# Patient Record
Sex: FEMALE | Race: WHITE | NOT HISPANIC OR LATINO
[De-identification: names, ages, dates, MRNs, and addresses within clinical notes are randomized per-mention and may not be internally consistent; named-entity substitution may affect disease eponyms.]

---

## 2017-07-25 PROBLEM — Z00.00 ENCOUNTER FOR PREVENTIVE HEALTH EXAMINATION: Status: ACTIVE | Noted: 2017-07-25

## 2017-07-26 ENCOUNTER — APPOINTMENT (OUTPATIENT)
Dept: GASTROENTEROLOGY | Facility: CLINIC | Age: 57
End: 2017-07-26

## 2017-07-26 VITALS
SYSTOLIC BLOOD PRESSURE: 98 MMHG | TEMPERATURE: 98.3 F | HEIGHT: 66 IN | HEART RATE: 110 BPM | DIASTOLIC BLOOD PRESSURE: 68 MMHG | WEIGHT: 118 LBS | OXYGEN SATURATION: 95 % | BODY MASS INDEX: 18.96 KG/M2

## 2017-07-26 DIAGNOSIS — F17.200 NICOTINE DEPENDENCE, UNSPECIFIED, UNCOMPLICATED: ICD-10-CM

## 2017-07-26 DIAGNOSIS — Z87.19 PERSONAL HISTORY OF OTHER DISEASES OF THE DIGESTIVE SYSTEM: ICD-10-CM

## 2017-07-26 RX ORDER — OMEPRAZOLE 40 MG/1
40 CAPSULE, DELAYED RELEASE ORAL
Qty: 90 | Refills: 0 | Status: ACTIVE | COMMUNITY
Start: 2017-07-26 | End: 1900-01-01

## 2017-07-26 RX ORDER — OXYCODONE AND ACETAMINOPHEN TABLETS 10; 300 MG/1; MG/1
TABLET ORAL
Refills: 0 | Status: ACTIVE | COMMUNITY

## 2017-07-26 RX ORDER — QUETIAPINE 100 MG/1
100 TABLET, FILM COATED ORAL
Refills: 0 | Status: ACTIVE | COMMUNITY

## 2017-07-28 ENCOUNTER — APPOINTMENT (OUTPATIENT)
Dept: GASTROENTEROLOGY | Facility: AMBULATORY MEDICAL SERVICES | Age: 57
End: 2017-07-28
Payer: COMMERCIAL

## 2017-07-28 PROCEDURE — 45380 COLONOSCOPY AND BIOPSY: CPT

## 2017-07-28 PROCEDURE — 45378 DIAGNOSTIC COLONOSCOPY: CPT

## 2017-07-28 PROCEDURE — 43239 EGD BIOPSY SINGLE/MULTIPLE: CPT

## 2017-07-28 PROCEDURE — 43235 EGD DIAGNOSTIC BRUSH WASH: CPT

## 2017-08-03 ENCOUNTER — APPOINTMENT (OUTPATIENT)
Dept: GASTROENTEROLOGY | Facility: CLINIC | Age: 57
End: 2017-08-03

## 2017-08-04 ENCOUNTER — APPOINTMENT (OUTPATIENT)
Dept: GASTROENTEROLOGY | Facility: CLINIC | Age: 57
End: 2017-08-04

## 2017-08-24 ENCOUNTER — APPOINTMENT (OUTPATIENT)
Dept: GASTROENTEROLOGY | Facility: CLINIC | Age: 57
End: 2017-08-24
Payer: COMMERCIAL

## 2017-08-24 ENCOUNTER — LABORATORY RESULT (OUTPATIENT)
Age: 57
End: 2017-08-24

## 2017-08-24 VITALS
TEMPERATURE: 97.8 F | SYSTOLIC BLOOD PRESSURE: 88 MMHG | DIASTOLIC BLOOD PRESSURE: 64 MMHG | HEIGHT: 66 IN | WEIGHT: 115 LBS | BODY MASS INDEX: 18.48 KG/M2

## 2017-08-24 PROCEDURE — 99213 OFFICE O/P EST LOW 20 MIN: CPT

## 2017-08-24 RX ORDER — OMEPRAZOLE 40 MG/1
40 CAPSULE, DELAYED RELEASE ORAL
Qty: 90 | Refills: 2 | Status: ACTIVE | COMMUNITY
Start: 2017-08-24 | End: 1900-01-01

## 2017-08-31 LAB
ALBUMIN SERPL ELPH-MCNC: 3.6 G/DL
ALP BLD-CCNC: 146 U/L
ALT SERPL-CCNC: 33 U/L
AMYLASE/CREAT SERPL: 29 U/L
ANION GAP SERPL CALC-SCNC: 15 MMOL/L
AST SERPL-CCNC: 61 U/L
BASOPHILS # BLD AUTO: 0 K/UL
BASOPHILS NFR BLD AUTO: 0 %
BILIRUB SERPL-MCNC: 0.7 MG/DL
BUN SERPL-MCNC: 5 MG/DL
CALCIUM SERPL-MCNC: 9.9 MG/DL
CHLORIDE SERPL-SCNC: 101 MMOL/L
CO2 SERPL-SCNC: 25 MMOL/L
CREAT SERPL-MCNC: 0.46 MG/DL
ENDOMYSIUM IGA SER QL: NORMAL
ENDOMYSIUM IGA TITR SER: NORMAL
EOSINOPHIL # BLD AUTO: 0 K/UL
EOSINOPHIL NFR BLD AUTO: 0 %
GGT SERPL-CCNC: 100 U/L
GLIADIN IGA SER QL: 98.5 UNITS
GLIADIN IGG SER QL: 5.2 UNITS
GLIADIN PEPTIDE IGA SER-ACNC: POSITIVE
GLIADIN PEPTIDE IGG SER-ACNC: NEGATIVE
GLUCOSE SERPL-MCNC: 111 MG/DL
HCT VFR BLD CALC: 37.8 %
HGB BLD-MCNC: 12.7 G/DL
LPL SERPL-CCNC: 31 U/L
LYMPHOCYTES # BLD AUTO: 1.1 K/UL
LYMPHOCYTES NFR BLD AUTO: 38.1 %
MAN DIFF?: NORMAL
MCHC RBC-ENTMCNC: 29.7 PG
MCHC RBC-ENTMCNC: 33.6 GM/DL
MCV RBC AUTO: 88.3 FL
MONOCYTES # BLD AUTO: 0.23 K/UL
MONOCYTES NFR BLD AUTO: 8 %
NEUTROPHILS # BLD AUTO: 1.56 K/UL
NEUTROPHILS NFR BLD AUTO: 53.9 %
PLATELET # BLD AUTO: 229 K/UL
POTASSIUM SERPL-SCNC: 3.9 MMOL/L
PROT SERPL-MCNC: 7.1 G/DL
RBC # BLD: 4.28 M/UL
RBC # FLD: 14.9 %
SODIUM SERPL-SCNC: 141 MMOL/L
TTG IGA SER IA-ACNC: 9.3 UNITS
TTG IGA SER-ACNC: NEGATIVE
TTG IGG SER IA-ACNC: <5 UNITS
TTG IGG SER IA-ACNC: NEGATIVE
WBC # FLD AUTO: 2.89 K/UL

## 2017-09-13 ENCOUNTER — APPOINTMENT (OUTPATIENT)
Dept: GASTROENTEROLOGY | Facility: CLINIC | Age: 57
End: 2017-09-13
Payer: COMMERCIAL

## 2017-09-13 VITALS
OXYGEN SATURATION: 96 % | DIASTOLIC BLOOD PRESSURE: 60 MMHG | HEIGHT: 66 IN | WEIGHT: 114 LBS | BODY MASS INDEX: 18.32 KG/M2 | HEART RATE: 116 BPM | TEMPERATURE: 97.5 F | SYSTOLIC BLOOD PRESSURE: 98 MMHG

## 2017-09-13 PROCEDURE — 99214 OFFICE O/P EST MOD 30 MIN: CPT

## 2017-09-19 LAB — HBV CORE IGM SER QL: NONREACTIVE

## 2017-09-24 LAB
ALBUMIN SERPL ELPH-MCNC: 3.3 G/DL
ALP BLD-CCNC: 224 U/L
ALT SERPL-CCNC: 10 U/L
AST SERPL-CCNC: 27 U/L
BASOPHILS # BLD AUTO: 0 K/UL
BASOPHILS NFR BLD AUTO: 0 %
BILIRUB DIRECT SERPL-MCNC: 0.2 MG/DL
BILIRUB INDIRECT SERPL-MCNC: 0.4 MG/DL
BILIRUB SERPL-MCNC: 0.6 MG/DL
EOSINOPHIL # BLD AUTO: 0.04 K/UL
EOSINOPHIL NFR BLD AUTO: 1 %
HBV SURFACE AG SER QL: NONREACTIVE
HCT VFR BLD CALC: 37.6 %
HCV AB SER QL: NONREACTIVE
HCV S/CO RATIO: 0.2 S/CO
HGB BLD-MCNC: 12.4 G/DL
IMM GRANULOCYTES NFR BLD AUTO: 0 %
LYMPHOCYTES # BLD AUTO: 1.73 K/UL
LYMPHOCYTES NFR BLD AUTO: 44.4 %
MAN DIFF?: NORMAL
MCHC RBC-ENTMCNC: 28.9 PG
MCHC RBC-ENTMCNC: 33 GM/DL
MCV RBC AUTO: 87.6 FL
MONOCYTES # BLD AUTO: 0.49 K/UL
MONOCYTES NFR BLD AUTO: 12.6 %
NEUTROPHILS # BLD AUTO: 1.64 K/UL
NEUTROPHILS NFR BLD AUTO: 42 %
PLATELET # BLD AUTO: 257 K/UL
PROT SERPL-MCNC: 6.6 G/DL
RBC # BLD: 4.29 M/UL
RBC # FLD: 14.5 %
WBC # FLD AUTO: 3.9 K/UL

## 2017-10-26 ENCOUNTER — APPOINTMENT (OUTPATIENT)
Dept: GASTROENTEROLOGY | Facility: CLINIC | Age: 57
End: 2017-10-26
Payer: COMMERCIAL

## 2017-10-26 VITALS
BODY MASS INDEX: 16.23 KG/M2 | SYSTOLIC BLOOD PRESSURE: 90 MMHG | HEIGHT: 66 IN | WEIGHT: 101 LBS | DIASTOLIC BLOOD PRESSURE: 70 MMHG | TEMPERATURE: 97.7 F

## 2017-10-26 PROCEDURE — 99214 OFFICE O/P EST MOD 30 MIN: CPT

## 2017-11-01 ENCOUNTER — LABORATORY RESULT (OUTPATIENT)
Age: 57
End: 2017-11-01

## 2017-11-01 ENCOUNTER — APPOINTMENT (OUTPATIENT)
Dept: GASTROENTEROLOGY | Facility: CLINIC | Age: 57
End: 2017-11-01
Payer: COMMERCIAL

## 2017-11-01 VITALS
TEMPERATURE: 97.8 F | HEIGHT: 66 IN | BODY MASS INDEX: 16.71 KG/M2 | SYSTOLIC BLOOD PRESSURE: 80 MMHG | WEIGHT: 104 LBS | DIASTOLIC BLOOD PRESSURE: 56 MMHG

## 2017-11-01 DIAGNOSIS — Z80.0 FAMILY HISTORY OF MALIGNANT NEOPLASM OF DIGESTIVE ORGANS: ICD-10-CM

## 2017-11-01 DIAGNOSIS — Z82.49 FAMILY HISTORY OF ISCHEMIC HEART DISEASE AND OTHER DISEASES OF THE CIRCULATORY SYSTEM: ICD-10-CM

## 2017-11-01 LAB
ALBUMIN SERPL ELPH-MCNC: 4.2 G/DL
ALP BLD-CCNC: 274 U/L
ALT SERPL-CCNC: 16 U/L
ANION GAP SERPL CALC-SCNC: 16 MMOL/L
AST SERPL-CCNC: 47 U/L
BASOPHILS # BLD AUTO: 0 K/UL
BASOPHILS NFR BLD AUTO: 0 %
BILIRUB SERPL-MCNC: 0.5 MG/DL
BUN SERPL-MCNC: 9 MG/DL
CALCIUM SERPL-MCNC: 10.5 MG/DL
CHLORIDE SERPL-SCNC: 104 MMOL/L
CO2 SERPL-SCNC: 26 MMOL/L
CREAT SERPL-MCNC: 0.5 MG/DL
EOSINOPHIL # BLD AUTO: 0.01 K/UL
EOSINOPHIL NFR BLD AUTO: 0.2 %
GGT SERPL-CCNC: 118 U/L
GLUCOSE SERPL-MCNC: 120 MG/DL
HCT VFR BLD CALC: 40.6 %
HGB BLD-MCNC: 13 G/DL
HIV1+2 AB SPEC QL IA.RAPID: NONREACTIVE
IMM GRANULOCYTES NFR BLD AUTO: 0.2 %
LYMPHOCYTES # BLD AUTO: 1.21 K/UL
LYMPHOCYTES NFR BLD AUTO: 21 %
MAN DIFF?: NORMAL
MCHC RBC-ENTMCNC: 28.3 PG
MCHC RBC-ENTMCNC: 32 GM/DL
MCV RBC AUTO: 88.3 FL
MITOCHONDRIA AB SER IF-ACNC: NORMAL
MONOCYTES # BLD AUTO: 0.38 K/UL
MONOCYTES NFR BLD AUTO: 6.6 %
NEUTROPHILS # BLD AUTO: 4.16 K/UL
NEUTROPHILS NFR BLD AUTO: 72 %
PLATELET # BLD AUTO: 285 K/UL
POTASSIUM SERPL-SCNC: 4.9 MMOL/L
PROT SERPL-MCNC: 8.4 G/DL
RBC # BLD: 4.6 M/UL
RBC # FLD: 14.4 %
SODIUM SERPL-SCNC: 146 MMOL/L
WBC # FLD AUTO: 5.77 K/UL

## 2017-11-01 PROCEDURE — 99213 OFFICE O/P EST LOW 20 MIN: CPT

## 2017-11-02 LAB
BAKER'S YEAST AB QL: 57.7 UNITS
BAKER'S YEAST IGA QL IA: 49.2 UNITS
BAKER'S YEAST IGA QN IA: POSITIVE
BAKER'S YEAST IGG QN IA: POSITIVE
MPO AB + PR3 PNL SER: NORMAL
T3 SERPL-MCNC: 605 NG/DL
T4 SERPL-MCNC: 18.6 UG/DL
TSH SERPL-ACNC: 0.01 UIU/ML

## 2017-11-06 LAB
ANA PAT FLD IF-IMP: ABNORMAL
ANA PATTERN: ABNORMAL
ANA SER IF-ACNC: ABNORMAL
ANA TITER: ABNORMAL

## 2017-11-09 ENCOUNTER — APPOINTMENT (OUTPATIENT)
Dept: GASTROENTEROLOGY | Facility: CLINIC | Age: 57
End: 2017-11-09

## 2017-11-10 ENCOUNTER — INPATIENT (INPATIENT)
Facility: HOSPITAL | Age: 57
LOS: 0 days | Discharge: LEFT BEFORE DISCHARGE | DRG: 643 | End: 2017-11-11
Attending: INTERNAL MEDICINE | Admitting: INTERNAL MEDICINE
Payer: COMMERCIAL

## 2017-11-10 VITALS
TEMPERATURE: 98 F | OXYGEN SATURATION: 96 % | DIASTOLIC BLOOD PRESSURE: 77 MMHG | HEART RATE: 109 BPM | WEIGHT: 100.09 LBS | RESPIRATION RATE: 16 BRPM | SYSTOLIC BLOOD PRESSURE: 119 MMHG

## 2017-11-10 DIAGNOSIS — M54.9 DORSALGIA, UNSPECIFIED: ICD-10-CM

## 2017-11-10 DIAGNOSIS — R63.4 ABNORMAL WEIGHT LOSS: ICD-10-CM

## 2017-11-10 DIAGNOSIS — Z98.890 OTHER SPECIFIED POSTPROCEDURAL STATES: Chronic | ICD-10-CM

## 2017-11-10 DIAGNOSIS — E05.90 THYROTOXICOSIS, UNSPECIFIED WITHOUT THYROTOXIC CRISIS OR STORM: ICD-10-CM

## 2017-11-10 DIAGNOSIS — R91.8 OTHER NONSPECIFIC ABNORMAL FINDING OF LUNG FIELD: ICD-10-CM

## 2017-11-10 DIAGNOSIS — K83.8 OTHER SPECIFIED DISEASES OF BILIARY TRACT: ICD-10-CM

## 2017-11-10 DIAGNOSIS — M32.9 SYSTEMIC LUPUS ERYTHEMATOSUS, UNSPECIFIED: ICD-10-CM

## 2017-11-10 LAB
ALBUMIN SERPL ELPH-MCNC: 3.7 G/DL — SIGNIFICANT CHANGE UP (ref 3.3–5)
ALP SERPL-CCNC: 242 U/L — HIGH (ref 40–120)
ALT FLD-CCNC: 12 U/L RC — SIGNIFICANT CHANGE UP (ref 10–45)
ANION GAP SERPL CALC-SCNC: 14 MMOL/L — SIGNIFICANT CHANGE UP (ref 5–17)
APPEARANCE UR: ABNORMAL
AST SERPL-CCNC: 20 U/L — SIGNIFICANT CHANGE UP (ref 10–40)
BASOPHILS # BLD AUTO: 0 K/UL — SIGNIFICANT CHANGE UP (ref 0–0.2)
BASOPHILS NFR BLD AUTO: 0.4 % — SIGNIFICANT CHANGE UP (ref 0–2)
BILIRUB SERPL-MCNC: 0.4 MG/DL — SIGNIFICANT CHANGE UP (ref 0.2–1.2)
BILIRUB UR-MCNC: NEGATIVE — SIGNIFICANT CHANGE UP
BUN SERPL-MCNC: 12 MG/DL — SIGNIFICANT CHANGE UP (ref 7–23)
CALCIUM SERPL-MCNC: 9.9 MG/DL — SIGNIFICANT CHANGE UP (ref 8.4–10.5)
CHLORIDE SERPL-SCNC: 102 MMOL/L — SIGNIFICANT CHANGE UP (ref 96–108)
CO2 SERPL-SCNC: 23 MMOL/L — SIGNIFICANT CHANGE UP (ref 22–31)
COLOR SPEC: YELLOW — SIGNIFICANT CHANGE UP
CREAT SERPL-MCNC: 0.54 MG/DL — SIGNIFICANT CHANGE UP (ref 0.5–1.3)
DIFF PNL FLD: NEGATIVE — SIGNIFICANT CHANGE UP
EOSINOPHIL # BLD AUTO: 0.1 K/UL — SIGNIFICANT CHANGE UP (ref 0–0.5)
EOSINOPHIL NFR BLD AUTO: 1.4 % — SIGNIFICANT CHANGE UP (ref 0–6)
EPI CELLS # UR: SIGNIFICANT CHANGE UP /HPF
GLUCOSE SERPL-MCNC: 84 MG/DL — SIGNIFICANT CHANGE UP (ref 70–99)
GLUCOSE UR QL: NEGATIVE — SIGNIFICANT CHANGE UP
HCT VFR BLD CALC: 37 % — SIGNIFICANT CHANGE UP (ref 34.5–45)
HGB BLD-MCNC: 12.4 G/DL — SIGNIFICANT CHANGE UP (ref 11.5–15.5)
KETONES UR-MCNC: NEGATIVE — SIGNIFICANT CHANGE UP
LEUKOCYTE ESTERASE UR-ACNC: ABNORMAL
LIDOCAIN IGE QN: 16 U/L — SIGNIFICANT CHANGE UP (ref 7–60)
LYMPHOCYTES # BLD AUTO: 1.9 K/UL — SIGNIFICANT CHANGE UP (ref 1–3.3)
LYMPHOCYTES # BLD AUTO: 32.3 % — SIGNIFICANT CHANGE UP (ref 13–44)
MCHC RBC-ENTMCNC: 30.6 PG — SIGNIFICANT CHANGE UP (ref 27–34)
MCHC RBC-ENTMCNC: 33.5 GM/DL — SIGNIFICANT CHANGE UP (ref 32–36)
MCV RBC AUTO: 91.3 FL — SIGNIFICANT CHANGE UP (ref 80–100)
MONOCYTES # BLD AUTO: 0.6 K/UL — SIGNIFICANT CHANGE UP (ref 0–0.9)
MONOCYTES NFR BLD AUTO: 10 % — SIGNIFICANT CHANGE UP (ref 2–14)
NEUTROPHILS # BLD AUTO: 3.3 K/UL — SIGNIFICANT CHANGE UP (ref 1.8–7.4)
NEUTROPHILS NFR BLD AUTO: 55.9 % — SIGNIFICANT CHANGE UP (ref 43–77)
NITRITE UR-MCNC: NEGATIVE — SIGNIFICANT CHANGE UP
PH UR: 6 — SIGNIFICANT CHANGE UP (ref 5–8)
PLATELET # BLD AUTO: 244 K/UL — SIGNIFICANT CHANGE UP (ref 150–400)
POTASSIUM SERPL-MCNC: 4.1 MMOL/L — SIGNIFICANT CHANGE UP (ref 3.5–5.3)
POTASSIUM SERPL-SCNC: 4.1 MMOL/L — SIGNIFICANT CHANGE UP (ref 3.5–5.3)
PROT SERPL-MCNC: 6.8 G/DL — SIGNIFICANT CHANGE UP (ref 6–8.3)
PROT UR-MCNC: SIGNIFICANT CHANGE UP
RBC # BLD: 4.06 M/UL — SIGNIFICANT CHANGE UP (ref 3.8–5.2)
RBC # FLD: 13.4 % — SIGNIFICANT CHANGE UP (ref 10.3–14.5)
SODIUM SERPL-SCNC: 139 MMOL/L — SIGNIFICANT CHANGE UP (ref 135–145)
SP GR SPEC: 1.02 — SIGNIFICANT CHANGE UP (ref 1.01–1.02)
TSH SERPL-MCNC: 0.01 UIU/ML — LOW (ref 0.27–4.2)
UROBILINOGEN FLD QL: NEGATIVE — SIGNIFICANT CHANGE UP
WBC # BLD: 5.9 K/UL — SIGNIFICANT CHANGE UP (ref 3.8–10.5)
WBC # FLD AUTO: 5.9 K/UL — SIGNIFICANT CHANGE UP (ref 3.8–10.5)
WBC UR QL: SIGNIFICANT CHANGE UP /HPF (ref 0–5)

## 2017-11-10 PROCEDURE — 99285 EMERGENCY DEPT VISIT HI MDM: CPT

## 2017-11-10 RX ORDER — OXYCODONE HYDROCHLORIDE 5 MG/1
15 TABLET ORAL
Qty: 0 | Refills: 0 | Status: DISCONTINUED | OUTPATIENT
Start: 2017-11-10 | End: 2017-11-11

## 2017-11-10 RX ORDER — ALPRAZOLAM 0.25 MG
1 TABLET ORAL
Qty: 0 | Refills: 0 | Status: DISCONTINUED | OUTPATIENT
Start: 2017-11-10 | End: 2017-11-11

## 2017-11-10 RX ORDER — OXYCODONE HYDROCHLORIDE 5 MG/1
40 TABLET ORAL EVERY 12 HOURS
Qty: 0 | Refills: 0 | Status: DISCONTINUED | OUTPATIENT
Start: 2017-11-10 | End: 2017-11-11

## 2017-11-10 RX ORDER — SODIUM CHLORIDE 9 MG/ML
1000 INJECTION INTRAMUSCULAR; INTRAVENOUS; SUBCUTANEOUS ONCE
Qty: 0 | Refills: 0 | Status: COMPLETED | OUTPATIENT
Start: 2017-11-10 | End: 2017-11-10

## 2017-11-10 RX ORDER — FAMOTIDINE 10 MG/ML
20 INJECTION INTRAVENOUS ONCE
Qty: 0 | Refills: 0 | Status: COMPLETED | OUTPATIENT
Start: 2017-11-10 | End: 2017-11-10

## 2017-11-10 RX ORDER — QUETIAPINE FUMARATE 200 MG/1
100 TABLET, FILM COATED ORAL AT BEDTIME
Qty: 0 | Refills: 0 | Status: DISCONTINUED | OUTPATIENT
Start: 2017-11-10 | End: 2017-11-11

## 2017-11-10 RX ORDER — ONDANSETRON 8 MG/1
4 TABLET, FILM COATED ORAL ONCE
Qty: 0 | Refills: 0 | Status: COMPLETED | OUTPATIENT
Start: 2017-11-10 | End: 2017-11-10

## 2017-11-10 RX ADMIN — SODIUM CHLORIDE 1333.33 MILLILITER(S): 9 INJECTION INTRAMUSCULAR; INTRAVENOUS; SUBCUTANEOUS at 14:12

## 2017-11-10 RX ADMIN — FAMOTIDINE 20 MILLIGRAM(S): 10 INJECTION INTRAVENOUS at 14:12

## 2017-11-10 RX ADMIN — QUETIAPINE FUMARATE 100 MILLIGRAM(S): 200 TABLET, FILM COATED ORAL at 22:44

## 2017-11-10 NOTE — ED PROVIDER NOTE - MEDICAL DECISION MAKING DETAILS
Known hyperthyroidism w/ CBD and nodular findings of as yet unknown origin. Will check labs, discuss admission w/ hospitalist. No acute changes in the patient's symptoms to warrant repeat imaging at this time.

## 2017-11-10 NOTE — ED ADULT NURSE NOTE - OBJECTIVE STATEMENT
57 year old female presents to ED c/o weight loss (approx 52lbs) and decreased appetite x 3 months.  According to patient she had an endoscopy in July and diagnosed with an abd ulcer and had blood work done this week that has shown an overactive thyroid.  PMH of Lupus but does not take any Lupus medication. Patient also reports diarrhea, weakness but denies, chest pain, sob, dysuria or fever. Lung sounds clear. Abd nondistended nontender. Skin warm and dry.  at bedside. In no acute distress.

## 2017-11-10 NOTE — ED PROVIDER NOTE - ATTENDING CONTRIBUTION TO CARE
Nemes - 56yo f w chr back pain on Oxycodone/Oxycontin, chr LLQ abdom pain, vomiting/diarhhea. Extensive w/u by GI, nonspecific RP and pulmonary lymphadenopathy. Sen in by GI for admission/MRCP. LLQ tenderness on exam, somnolent but responsive and neuro intact. D/w Dr Castellanos, will admit

## 2017-11-10 NOTE — H&P ADULT - NEGATIVE GENERAL GENITOURINARY SYMPTOMS
no bladder infections/no dysuria/no flank pain L/no urine discoloration/no flank pain R/no renal colic/normal urinary frequency/no gas in urine/no incontinence/no urinary hesitancy/no hematuria/no nocturia

## 2017-11-10 NOTE — ED PROVIDER NOTE - CONSTITUTIONAL, MLM
normal... Cachectic, rigors, non-toxic, awake, alert, oriented to person, place, time/situation and in no apparent distress.

## 2017-11-10 NOTE — ED PROVIDER NOTE - PROGRESS NOTE DETAILS
Jonathan Weil, PGY1 - spoke w/ Dr. Castellanos (GI) who relates an extensive w/u that is significant for findings of hyperthyroidism, CBD dilitation, lung nodules, and retroperitoneal adenopthy (on CT chest and a/p, respectively). Pt has been non-compliant w/ outpt evals, including a missed appt for ERCP yesterday. Dr. Castellanos recommends she be admitted for further evaluation.

## 2017-11-10 NOTE — H&P ADULT - NEGATIVE CARDIOVASCULAR SYMPTOMS
no paroxysmal nocturnal dyspnea/no peripheral edema/no claudication/no orthopnea/no chest pain/no palpitations/no dyspnea on exertion

## 2017-11-10 NOTE — H&P ADULT - RS GEN PE MLT RESP DETAILS PC
no rales/no intercostal retractions/no rhonchi/no chest wall tenderness/normal/airway patent/clear to auscultation bilaterally/good air movement/breath sounds equal/respirations non-labored

## 2017-11-10 NOTE — H&P ADULT - NEGATIVE NEUROLOGICAL SYMPTOMS
no loss of sensation/no focal seizures/no paresthesias/no transient paralysis/no weakness/no generalized seizures/no vertigo/no tremors/no syncope

## 2017-11-10 NOTE — H&P ADULT - HISTORY OF PRESENT ILLNESS
57 F Smoker with PMH of lupus,Multiple Back surgeries was advised by her Gastroenterologist DR Jinny Castellanos to come to ED as has been non complaint with her appointments. She has been having  weight loss ,loss of appetite ,diarrhea and night sweats for 5 months. Associated w/ weight loss ~50 lbs, nausea, diarrhea, chills, intermittent LLQ abd pain, and insomnia. Dx hyperthyroidism last week, referred to [facility] for treatment, left AMA 4 days ago.

## 2017-11-10 NOTE — H&P ADULT - ASSESSMENT
57 F Smoker with PMH of lupus,Multiple Back surgeries was advised by her Gastroenterologist DR Jinny Castellanos to come to ED as has been non complaint with her appointments. She has been having  weight loss ,loss of appetite ,diarrhea and night sweats for 5 months. Associated w/ weight loss ~50 lbs, nausea, diarrhea, chills, intermittent LLQ abd pain, and insomnia. Dx hyperthyroidism last week, referred to [facility] for treatment, left AMA 4 days ago. Per Dr. Castellanos (GI) who relates an extensive w/u that is significant for findings of hyperthyroidism, CBD dilitation, lung nodules, and retroperitoneal adenopthy (on CT chest and a/p, respectively). Pt has been non-compliant w/ outpt evals, including a missed appt for ERCP yesterday. Dr. Castellanos recommends she be admitted for further evaluation.

## 2017-11-10 NOTE — ED PROVIDER NOTE - OBJECTIVE STATEMENT
57 F hx ?lupus? p/w weight loss for 5 months. Associated w/ weight loss ~50 lbs, nausea, diarrhea, chills, intermittent LLQ abd pain, and insomnia. Dx hyperthyroidism last week, referred to [facility] for treatment, left AMA 4 days ago. Return today on recommendation from Dr. Castellanos (GI) who said he wants her admitted for further evaluation. Pt has had extensive w/u beyond hyperthyroidism dx, but unsure about the other results. Denies fever/dyspnea/chest pain/vomiting.

## 2017-11-11 ENCOUNTER — TRANSCRIPTION ENCOUNTER (OUTPATIENT)
Age: 57
End: 2017-11-11

## 2017-11-11 VITALS
SYSTOLIC BLOOD PRESSURE: 137 MMHG | DIASTOLIC BLOOD PRESSURE: 86 MMHG | OXYGEN SATURATION: 96 % | RESPIRATION RATE: 18 BRPM | HEART RATE: 112 BPM | TEMPERATURE: 98 F

## 2017-11-11 DIAGNOSIS — F05 DELIRIUM DUE TO KNOWN PHYSIOLOGICAL CONDITION: ICD-10-CM

## 2017-11-11 LAB
ALBUMIN SERPL ELPH-MCNC: 3.6 G/DL — SIGNIFICANT CHANGE UP (ref 3.3–5)
ALP SERPL-CCNC: 240 U/L — HIGH (ref 40–120)
ALT FLD-CCNC: 13 U/L — SIGNIFICANT CHANGE UP (ref 10–45)
ANION GAP SERPL CALC-SCNC: 17 MMOL/L — SIGNIFICANT CHANGE UP (ref 5–17)
AST SERPL-CCNC: 31 U/L — SIGNIFICANT CHANGE UP (ref 10–40)
BILIRUB SERPL-MCNC: 0.6 MG/DL — SIGNIFICANT CHANGE UP (ref 0.2–1.2)
BUN SERPL-MCNC: 8 MG/DL — SIGNIFICANT CHANGE UP (ref 7–23)
CALCIUM SERPL-MCNC: 10.3 MG/DL — SIGNIFICANT CHANGE UP (ref 8.4–10.5)
CHLORIDE SERPL-SCNC: 102 MMOL/L — SIGNIFICANT CHANGE UP (ref 96–108)
CO2 SERPL-SCNC: 22 MMOL/L — SIGNIFICANT CHANGE UP (ref 22–31)
CREAT SERPL-MCNC: 0.45 MG/DL — LOW (ref 0.5–1.3)
GLUCOSE SERPL-MCNC: 105 MG/DL — HIGH (ref 70–99)
HCT VFR BLD CALC: 33.6 % — LOW (ref 34.5–45)
HGB BLD-MCNC: 11.2 G/DL — LOW (ref 11.5–15.5)
MCHC RBC-ENTMCNC: 28.2 PG — SIGNIFICANT CHANGE UP (ref 27–34)
MCHC RBC-ENTMCNC: 33.3 GM/DL — SIGNIFICANT CHANGE UP (ref 32–36)
MCV RBC AUTO: 84.6 FL — SIGNIFICANT CHANGE UP (ref 80–100)
PLATELET # BLD AUTO: 245 K/UL — SIGNIFICANT CHANGE UP (ref 150–400)
POTASSIUM SERPL-MCNC: 4 MMOL/L — SIGNIFICANT CHANGE UP (ref 3.5–5.3)
POTASSIUM SERPL-SCNC: 4 MMOL/L — SIGNIFICANT CHANGE UP (ref 3.5–5.3)
PROT SERPL-MCNC: 6.7 G/DL — SIGNIFICANT CHANGE UP (ref 6–8.3)
RBC # BLD: 3.97 M/UL — SIGNIFICANT CHANGE UP (ref 3.8–5.2)
RBC # FLD: 14.4 % — SIGNIFICANT CHANGE UP (ref 10.3–14.5)
SODIUM SERPL-SCNC: 141 MMOL/L — SIGNIFICANT CHANGE UP (ref 135–145)
T4 FREE SERPL-MCNC: 3.2 NG/DL — HIGH (ref 0.9–1.8)
TSH SERPL-MCNC: <0.01 UIU/ML — LOW (ref 0.27–4.2)
WBC # BLD: 5.89 K/UL — SIGNIFICANT CHANGE UP (ref 3.8–10.5)
WBC # FLD AUTO: 5.89 K/UL — SIGNIFICANT CHANGE UP (ref 3.8–10.5)

## 2017-11-11 PROCEDURE — 85027 COMPLETE CBC AUTOMATED: CPT

## 2017-11-11 PROCEDURE — 96374 THER/PROPH/DIAG INJ IV PUSH: CPT

## 2017-11-11 PROCEDURE — 70450 CT HEAD/BRAIN W/O DYE: CPT

## 2017-11-11 PROCEDURE — 81001 URINALYSIS AUTO W/SCOPE: CPT

## 2017-11-11 PROCEDURE — 84439 ASSAY OF FREE THYROXINE: CPT

## 2017-11-11 PROCEDURE — 83520 IMMUNOASSAY QUANT NOS NONAB: CPT

## 2017-11-11 PROCEDURE — 83690 ASSAY OF LIPASE: CPT

## 2017-11-11 PROCEDURE — 84443 ASSAY THYROID STIM HORMONE: CPT

## 2017-11-11 PROCEDURE — 99223 1ST HOSP IP/OBS HIGH 75: CPT

## 2017-11-11 PROCEDURE — 99253 IP/OBS CNSLTJ NEW/EST LOW 45: CPT | Mod: GC

## 2017-11-11 PROCEDURE — 80307 DRUG TEST PRSMV CHEM ANLYZR: CPT

## 2017-11-11 PROCEDURE — 84480 ASSAY TRIIODOTHYRONINE (T3): CPT

## 2017-11-11 PROCEDURE — 80053 COMPREHEN METABOLIC PANEL: CPT

## 2017-11-11 PROCEDURE — 70450 CT HEAD/BRAIN W/O DYE: CPT | Mod: 26

## 2017-11-11 PROCEDURE — 99285 EMERGENCY DEPT VISIT HI MDM: CPT | Mod: 25

## 2017-11-11 RX ORDER — METHIMAZOLE 10 MG/1
1 TABLET ORAL
Qty: 60 | Refills: 0 | OUTPATIENT
Start: 2017-11-11 | End: 2017-12-11

## 2017-11-11 RX ORDER — NICOTINE POLACRILEX 2 MG
1 GUM BUCCAL DAILY
Qty: 0 | Refills: 0 | Status: DISCONTINUED | OUTPATIENT
Start: 2017-11-11 | End: 2017-11-11

## 2017-11-11 RX ORDER — NICOTINE POLACRILEX 2 MG
1 GUM BUCCAL EVERY 4 HOURS
Qty: 0 | Refills: 0 | Status: DISCONTINUED | OUTPATIENT
Start: 2017-11-11 | End: 2017-11-11

## 2017-11-11 RX ORDER — ATENOLOL 25 MG/1
25 TABLET ORAL DAILY
Qty: 0 | Refills: 0 | Status: DISCONTINUED | OUTPATIENT
Start: 2017-11-11 | End: 2017-11-11

## 2017-11-11 RX ORDER — PROPRANOLOL HCL 160 MG
1 CAPSULE, EXTENDED RELEASE 24HR ORAL
Qty: 120 | Refills: 0 | OUTPATIENT
Start: 2017-11-11 | End: 2017-12-11

## 2017-11-11 RX ORDER — PROPRANOLOL HCL 160 MG
10 CAPSULE, EXTENDED RELEASE 24HR ORAL EVERY 6 HOURS
Qty: 0 | Refills: 0 | Status: DISCONTINUED | OUTPATIENT
Start: 2017-11-11 | End: 2017-11-11

## 2017-11-11 RX ORDER — NICOTINE POLACRILEX 2 MG
4 GUM BUCCAL
Qty: 0 | Refills: 0 | Status: DISCONTINUED | OUTPATIENT
Start: 2017-11-11 | End: 2017-11-11

## 2017-11-11 RX ORDER — ALPRAZOLAM 0.25 MG
1 TABLET ORAL ONCE
Qty: 0 | Refills: 0 | Status: DISCONTINUED | OUTPATIENT
Start: 2017-11-11 | End: 2017-11-11

## 2017-11-11 RX ORDER — SODIUM CHLORIDE 9 MG/ML
1000 INJECTION INTRAMUSCULAR; INTRAVENOUS; SUBCUTANEOUS
Qty: 0 | Refills: 0 | Status: DISCONTINUED | OUTPATIENT
Start: 2017-11-11 | End: 2017-11-11

## 2017-11-11 RX ADMIN — Medication 1 MILLIGRAM(S): at 09:24

## 2017-11-11 RX ADMIN — OXYCODONE HYDROCHLORIDE 15 MILLIGRAM(S): 5 TABLET ORAL at 14:15

## 2017-11-11 RX ADMIN — Medication 10 MILLIGRAM(S): at 14:14

## 2017-11-11 NOTE — PROVIDER CONTACT NOTE (OTHER) - RECOMMENDATIONS
NP and psych MD (both in the unit) to further evaluate patient of her capacity to carry out the threat. escalated situation with Kathleen, supervisor, sandy, covering ANM

## 2017-11-11 NOTE — BEHAVIORAL HEALTH ASSESSMENT NOTE - NSBHCONSULTMEDAGITATION_PSY_A_CORE FT
haldol 5mg PO/IV/IM q6h PRN for agitation administered with lorazepam 2mg PO/IV/IM q6h PRN for agitation. haldol 5mg PO/IV/IM q6h PRN for agitation , and lorazepam 2mg PO/IV/IM q6h PRN for agitation.

## 2017-11-11 NOTE — CONSULT NOTE ADULT - SUBJECTIVE AND OBJECTIVE BOX
Reason For Consult: Hyperthyroidism    HPI: 57 F Smoker with PMH of lupus,Multiple Back surgeries was advised by her Gastroenterologist DR Jinny Castellanos to come to ED as has been non complaint with her appointments. She has been having  weight loss ,loss of appetite ,diarrhea and night sweats for 5 months. Associated w/ weight loss ~50 lbs, nausea, diarrhea, chills, intermittent LLQ abd pain, and insomnia. Dx hyperthyroidism last week, referred to [facility] for treatment, left AMA 4 days ago. (10 Nov 2017 16:38)    Endocrine History:      PAST MEDICAL & SURGICAL HISTORY:  Lupus  Personal history of spine surgery      FAMILY HISTORY:  No pertinent family history in first degree relatives      Social History: +smoker    Outpatient Medications:    MEDICATIONS  (STANDING):  nicotine - 21 mG/24Hr(s) Patch 1 patch Transdermal daily  oxyCODONE  ER Tablet 40 milliGRAM(s) Oral every 12 hours  QUEtiapine 100 milliGRAM(s) Oral at bedtime  sodium chloride 0.9%. 1000 milliLiter(s) (125 mL/Hr) IV Continuous <Continuous>    MEDICATIONS  (PRN):  ALPRAZolam 1 milliGRAM(s) Oral two times a day PRN anxiety  nicotine  Polacrilex Gum 4 milliGRAM(s) Oral four times a day PRN nicotine with drawal  nicotine - Inhaler 1 Each Inhalation every 4 hours PRN nicotine withdrawal  oxyCODONE    IR 15 milliGRAM(s) Oral four times a day PRN Severe Pain (7 - 10)      Allergies  No Known Allergies    Review of Systems:  Constitutional: No fever  Eyes: No blurry vision  Neuro: No tremors  HEENT: No pain  Cardiovascular: No chest pain, palpitations  Respiratory: No SOB, no cough  GI: No nausea, vomiting, abdominal pain  : No dysuria  Skin: no rash  Psych: no depression  Endocrine: no polyuria, polydipsia  Hem/lymph: no swelling  Osteoporosis: no fractures    ALL OTHER SYSTEMS REVIEWED AND NEGATIVE    PHYSICAL EXAM:  VITALS: T(C): 31.7 (11-11-17 @ 04:00)  T(F): 89 (11-11-17 @ 04:00), Max: 99.1 (11-10-17 @ 18:46)  HR: 96 (11-11-17 @ 04:15) (89 - 109)  BP: 90/60 (11-11-17 @ 04:15) (89/52 - 119/77)  RR:  (16 - 18)  SpO2:  (90% - 96%)  Wt(kg): 51kg  GENERAL: NAD, well-groomed, well-developed  EYES: No proptosis, no lid lag, anicteric  HEENT:  Atraumatic, Normocephalic, moist mucous membranes  THYROID: Normal size, no palpable nodules  RESPIRATORY: Clear to auscultation bilaterally; No rales, rhonchi, wheezing, or rubs  CARDIOVASCULAR: Regular rate and rhythm; No murmurs; no peripheral edema  GI: Soft, nontender, non distended, normal bowel sounds  SKIN: Dry, intact, No rashes or lesions  MUSCULOSKELETAL: Full range of motion, normal strength  NEURO: sensation intact, extraocular movements intact, no tremor, normal reflexes  PSYCH: Alert and oriented x 3, normal affect, normal mood  CUSHING'S SIGNS: no striae                              12.4   5.9   )-----------( 244      ( 10 Nov 2017 14:32 )             37.0       11-10    139  |  102  |  12  ----------------------------<  84  4.1   |  23  |  0.54    EGFR if : 123  EGFR if non : 106    Ca    9.9      11-10    TPro  6.8  /  Alb  3.7  /  TBili  0.4  /  DBili  x   /  AST  20  /  ALT  12  /  AlkPhos  242<H>  11-10      Thyroid Function Tests:  11-10 @ 16:30 TSH 0.01 FreeT4 -- T3 -- Anti TPO -- Anti Thyroglobulin Ab -- TSI -- Reason For Consult: Hyperthyroidism    HPI: 57 F Smoker with PMH of lupus,Multiple Back surgeries was advised by her Gastroenterologist DR Jinny Castellanos to come to ED as has been non complaint with her appointments. She has been having  weight loss ,loss of appetite ,diarrhea and night sweats for 5 months. Associated w/ weight loss ~50 lbs, nausea, diarrhea, chills, intermittent LLQ abd pain, and insomnia. Dx hyperthyroidism last week, referred to [facility] for treatment, left AMA 4 days ago. (10 Nov 2017 16:38)    Endocrine History:  56 y/o female (A&Ox3) with acute delirium presented to the ED from GI appointment. Some history is reliable due to consistency in medical record reports. She has very labile mood and goes from being pleasant to angry within minutes. She states was diagnosed with hyperthyroidism at outside hospital and was not given any treatment. Admits to 50 lb weight loss in the last 3-4 months, nausea, diarrhea and has chronic insomnia. No dysphagia. No tremors, no diaphoresis. No hair loss. No palpitations. She is post menopausal. Active smoker and states she does not count her cigs. Temp was 99.1 on admission, Max . Her Tucker-Watrofsky score is 40 suggestive of impending storm (5 for temp, 5 for HR, 10 for delirium, 10 for mod GI s/s)    PAST MEDICAL & SURGICAL HISTORY:  Lupus  Personal history of spine surgery      FAMILY HISTORY:  No pertinent family history in first degree relatives      Social History: +smoker. + caffeine, no alcohol, states no drugs of abuse    Outpatient Medications: None    MEDICATIONS  (STANDING):  nicotine - 21 mG/24Hr(s) Patch 1 patch Transdermal daily  oxyCODONE  ER Tablet 40 milliGRAM(s) Oral every 12 hours  QUEtiapine 100 milliGRAM(s) Oral at bedtime  sodium chloride 0.9%. 1000 milliLiter(s) (125 mL/Hr) IV Continuous <Continuous>    MEDICATIONS  (PRN):  ALPRAZolam 1 milliGRAM(s) Oral two times a day PRN anxiety  nicotine  Polacrilex Gum 4 milliGRAM(s) Oral four times a day PRN nicotine with drawal  nicotine - Inhaler 1 Each Inhalation every 4 hours PRN nicotine withdrawal  oxyCODONE    IR 15 milliGRAM(s) Oral four times a day PRN Severe Pain (7 - 10)      Allergies  No Known Allergies    Review of Systems:  Constitutional: No fever  Eyes: No blurry vision  Neuro: No tremors  HEENT: No pain  Cardiovascular: No chest pain, palpitations  Respiratory: No SOB, no cough  GI: + nausea, vomiting, abdominal pain  : No dysuria  Skin: no rash  Psych: no depression  Endocrine: no polyuria, polydipsia      ALL OTHER SYSTEMS REVIEWED AND NEGATIVE    PHYSICAL EXAM:  VITALS: T(C): 31.7 (11-11-17 @ 04:00)  T(F): 89 (11-11-17 @ 04:00), Max: 99.1 (11-10-17 @ 18:46)  HR: 96 (11-11-17 @ 04:15) (89 - 109)  BP: 90/60 (11-11-17 @ 04:15) (89/52 - 119/77)  RR:  (16 - 18)  SpO2:  (90% - 96%)  Wt(kg): 51kg  GENERAL: NAD, well-groomed, well-developed, thin female, appears malnourished  EYES: No proptosis, no lid lag, anicteric  HEENT:  Atraumatic, Normocephalic, moist mucous membranes  THYROID: Normal size, no palpable nodules, no bruit or thrill  RESPIRATORY: + wheezing no rubs  CARDIOVASCULAR: + tachycardia and regular rhythm; No murmurs; no peripheral edema  GI: Soft, nontender, non distended, normal bowel sounds  SKIN: Dry, intact, No rashes or lesions  MUSCULOSKELETAL: Full range of motion, normal strength  NEURO: sensation intact, extraocular movements intact, no tremor, normal reflexes  PSYCH: Alert and oriented x 3, labile mood, appears hyperactive  CUSHING'S SIGNS: no striae                              12.4   5.9   )-----------( 244      ( 10 Nov 2017 14:32 )             37.0       11-10    139  |  102  |  12  ----------------------------<  84  4.1   |  23  |  0.54    EGFR if : 123  EGFR if non : 106    Ca    9.9      11-10    TPro  6.8  /  Alb  3.7  /  TBili  0.4  /  DBili  x   /  AST  20  /  ALT  12  /  AlkPhos  242<H>  11-10      Thyroid Function Tests:  11-10 @ 16:30 TSH 0.01 FreeT4 -- T3 -- Anti TPO -- Anti Thyroglobulin Ab -- TSI --

## 2017-11-11 NOTE — BEHAVIORAL HEALTH ASSESSMENT NOTE - NSBHCONSULTMEDS_PSY_A_CORE FT
1. Continuous observation recommended due to elopement risk and risk of harm to patient or others 2/2 delirium.  2. Patient lacks capacity to leave AMA due to ongoing delirium.  3. Standing psychiatric medications: c/w alprazolam 0.5mg BID; do not recommend abrupt discontinuation of benzodiazepine. IStop #: [id number] confirms last dispensed date 10/31.  4. PRNs: haldol 5mg PO/IV/IM q6h PRN for agitation administered with lorazepam 2mg PO/IV/IM q6h PRN for agitation.  5. Supportive treatment of delirium: 1) Minimize use of benzodiazepines, opioids, anticholinergics, and other deliriogenic agents whenever possible.  2) Maintain sleep wake cycle.  3) Provide frequent reorientation and redirection.  4) Family member at bedside if possible. 5) Provide corrective lenses if required.  6. CL Psych will continue to follow. 1. Continuous observation recommended due to elopement risk and risk of harm to patient or others 2/2 delirium.  2. Patient lacks capacity to leave AMA due to ongoing delirium.  3. Standing psychiatric medications recommendation:   - C/w alprazolam 0.5mg BID; do not recommend abrupt discontinuation of benzodiazepine. IStop #: [id number] confirms last dispensed date 10/31.  - C/w home quetiapine 100mg PO qhs.  4. PRNs:   - Recommend haldol 5mg PO/IV/IM q6h PRN for agitation administered with lorazepam 2mg PO/IV/IM q6h PRN for agitation.  5. Supportive treatment of delirium: 1) Minimize use of benzodiazepines, opioids, anticholinergics, and other deliriogenic agents whenever possible.  2) Maintain sleep wake cycle.  3) Provide frequent reorientation and redirection.  4) Family member at bedside if possible. 5) Provide corrective lenses if required.  6. CL Psych will continue to follow.

## 2017-11-11 NOTE — BEHAVIORAL HEALTH ASSESSMENT NOTE - NSBHSUICPROTECTFACT_PSY_A_CORE
Responsibility to family and others/Identifies reasons for living/Future oriented/Supportive social network or family Future oriented/Supportive social network or family/Responsibility to family and others/Fear of death or dying due to pain/suffering/Identifies reasons for living

## 2017-11-11 NOTE — CONSULT NOTE ADULT - ATTENDING COMMENTS
56 yo female with thyroid disease and agitation and thyroid nodule and pain medication seeking  differential includes graves disease and thyroidistis and toxic nodule  thyroid antibodies are pending.  She is agitated and has tachycardia. Use low dose propanolol since her BP has been low.  Will decide if tapazole is indicated depending on the rest of blood tests.  On exam she has isthmus nodule 1.5 cm and will need thyroid US can be done as outpatient.  will likely need thyroid uptake and scan would not favor BANSAL treatment since she is a smoker  Can follow in endocrine office

## 2017-11-11 NOTE — CHART NOTE - NSCHARTNOTEFT_GEN_A_CORE
11/11/2017 11.05 Medicine NP Called for belligerent behavior, threatening to leave against medical advice and return with her spouse and guns  56 yo admitted for >50 lb wt loss, nausea, diarrhea, found to hyperthyroid and sent for further workup. Pt on 1:1 constant observation threatening to leave against medical advice, racial slurs against staff and belligerent.  Security had attempted to elope found by [place] elevators and escorted back to her room.  Patient states room is filthy and staff is rude and that is her reason for wanting to leave. She recants the threat about returning with her  and bringing guns. Presently, she is alert and oriented x 3. Psychiatry states no imminent psychiatric issues. Hospital Operations, Kathleennila Antunez notified [facility] 6[facility] PCT regarding patient's gun threat.  Patient has signed out against medical advice. Risks of leaving including worsening symptoms, stroke, heart attack and death were explained and understood. Dr Yair negro.

## 2017-11-11 NOTE — DISCHARGE NOTE ADULT - CARE PROVIDER_API CALL
Marcel Mazariegos), Internal Medicine  [address] [place] [address]  Phone: [phone]  Fax: [phone]    Shari Grullon), EndocrinologyMetabDiabetes; Internal Medicine  [address] [place] [address]  Phone: [phone]  Fax: [phone]

## 2017-11-11 NOTE — BEHAVIORAL HEALTH ASSESSMENT NOTE - NSBHCHARTREVIEWLAB_PSY_A_CORE FT
11.2   5.89  )-----------( 245      ( 11 Nov 2017 13:28 )             33.6     11-11    141  |  102  |  8   ----------------------------<  105<H>  4.0   |  22  |  0.45<L>    Ca    10.3      11 Nov 2017 13:28    TPro  6.7  /  Alb  3.6  /  TBili  0.6  /  DBili  x   /  AST  31  /  ALT  13  /  AlkPhos  240<H>  11-11    Thyroid Stimulating Hormone, Serum (11.10.17 @ 16:30)    Thyroid Stimulating Hormone, Serum: 0.01: Test Repeated uIU/mL

## 2017-11-11 NOTE — DISCHARGE NOTE ADULT - HOSPITAL COURSE
57 F Smoker with PMH of lupus,Multiple Back surgeries was advised by her Gastroenterologist DR Jinny Castellanos to come to ED as has been non complaint with her appointments. She has been having  weight loss ,loss of appetite ,diarrhea and night sweats for 5 months. Associated w/ weight loss ~50 lbs, nausea, diarrhea, chills, intermittent LLQ abd pain, and insomnia. Dx hyperthyroidism last week, referred to [facility] for treatment, left AMA 4 days ago. Per Dr. Castellanos (GI) who relates an extensive w/u that is significant for findings of hyperthyroidism, CBD dilitation, lung nodules, and retroperitoneal adenopthy (on CT chest and a/p, respectively). Pt has been non-compliant w/ outpt evals, including a missed appt for ERCP yesterday. Dr. Castellanos recommends she be admitted for further evaluation.

## 2017-11-11 NOTE — DISCHARGE NOTE ADULT - NS TRANSFER PATIENT BELONGINGS
Jewelry/Money (specify)/Other belongings/medications returned to patient/Clothing/Cell Phone/PDA (specify)

## 2017-11-11 NOTE — DISCHARGE NOTE ADULT - PATIENT PORTAL LINK FT
“You can access the Follow[facility] Patient Portal, offered by [facility], by registering with the following website: [email or web address]” Stable

## 2017-11-11 NOTE — BEHAVIORAL HEALTH ASSESSMENT NOTE - HPI (INCLUDE ILLNESS QUALITY, SEVERITY, DURATION, TIMING, CONTEXT, MODIFYING FACTORS, ASSOCIATED SIGNS AND SYMPTOMS)
56 y/o F  once, domiciled with  and two teenaged children, one adult daughter living outside the home, PMHx of SLE and multiple back surgeries, PPHx of ongoing tobacco use (pack a day), alcohol abuse sober 13 years with AA, h/o seeing psychiatrist once during custody dispute with ex-, no formal psychiatric d/o, taking seroquel at night for sleep and Xanax for anxiety prescribed by PCP, no h/o SA, no legal history who presented for treatment of hyperthyroidism.  Psychiatry consulted for AMS and agitation. On arrival, staff informed interviewer that pt was detained trying to elope to smoke. Medications were removed from purse and sent to pharmacy for evaluation. Per NP, pt has been verbally abusive towards staff and made threatening comment about returning with a gun.    On interview, pt states that the hospital is "disgusting" and people have been "very rude to [her]." She wishes to leave the building to smoke and states that NRT does not work for her. She states she wishes to leave to obtain her care at [facility], where her  works. Asked about threatening comment made to staff, she denies stating she would return with a gun to the hospital. She stated that she meant that she was "so miserable" about being in a hospital that is "disgusting" that she would "put a gun to her head." She stated that it was a figure of speech and not meant to be literal. She strongly denies SI/I/P. Denies having access to a firearm. Asked if she wanted to harm others, she states she would "defend herself" if someone threatened at her. Denies active homicidal ideation, intent, and plan. She denies history of fighting or aggression. She denies dysphoric mood, anhedonia, anxiety, AH/VH, paranoid ideation, symptoms of keila, history of trauma, SI/I/P.  She states she saw a psychiatrist once during a custody dispute with her ex-, but she does not know why she had to see a psychiatrist at that time. She denies psychiatric diagnoses. She denies h/o psychotherapy/counseling. She states she takes Seroquel for sleep prescribed by per PCP. She states she has a distant history of alcohol abuse and has been sober for 13 years with AA.    Per collateral obtained from , recent medical issues have "taken a toll." He states she becomes upset when she is not able to smoke and has left AMA from the hospital before. He denies having a gun in the home and denies any history of prior violent behavior from patient, stating " 58 y/o F  once, domiciled with  and two teenaged children, one adult daughter living outside the home, PMHx of SLE and multiple back surgeries, PPHx of ongoing tobacco use (pack a day), alcohol abuse sober 13 years with AA, h/o seeing psychiatrist once during custody dispute with ex-, no formal psychiatric d/o, taking seroquel at night for sleep and Xanax for anxiety prescribed by PCP, no h/o SA, no legal history who presented for treatment of hyperthyroidism, active thyrotoxicosis per Endo, pending evaluation for thyroid nodules.  Psychiatry consulted for AMS and agitation. On arrival, staff informed interviewer that pt was detained trying to elope to smoke. Medications were removed from purse and sent to pharmacy for evaluation. Per NP, pt has been verbally abusive towards staff and made threatening comment about returning with a gun.    On interview, pt states that the hospital is "disgusting" and people have been "very rude to [her]." She wishes to leave the building to smoke and states that NRT does not work for her. She states she wishes to leave to obtain her care at [facility], where her  works. Asked about threatening comment made to staff, she denies stating she would return with a gun to the hospital. She stated that she meant that she was "so miserable" about being in a hospital that is "disgusting" that she would "put a gun to her head." She stated that it was a figure of speech and not meant to be literal. She strongly denies SI/I/P. Denies having access to a firearm. Asked if she wanted to harm others, she states she would "defend herself" if someone threatened at her. Denies active homicidal ideation, intent, and plan. She denies history of fighting or aggression. She denies dysphoric mood, anhedonia, anxiety, AH/VH, paranoid ideation, symptoms of keila, history of trauma, SI/I/P.  She states she saw a psychiatrist once during a custody dispute with her ex-, but she does not know why she had to see a psychiatrist at that time. She denies psychiatric diagnoses. She denies h/o psychotherapy/counseling. She states she takes Seroquel for sleep prescribed by per PCP. She states she has a distant history of alcohol abuse and has been sober for 13 years with AA.    Per collateral obtained from , recent medical issues have "taken a toll." He states she becomes upset when she is not able to smoke and has left AMA from the hospital before. He denies having a gun in the home and denies any history of prior violent behavior from patient, stating that pt does become irritable when prevented from smoking and also has been distressed by recent medical problems.

## 2017-11-11 NOTE — PROGRESS NOTE ADULT - SUBJECTIVE AND OBJECTIVE BOX
INTERVAL HPI/OVERNIGHT EVENTS: Seen and examined.  I am going home. This hospital is dirty   Vital Signs Last 24 Hrs  T(C): 31.7 (2017 04:00), Max: 37.3 (10 Nov 2017 18:46)  T(F): 89 (2017 04:00), Max: 99.1 (10 Nov 2017 18:46)  HR: 96 (2017 04:15) (89 - 109)  BP: 90/60 (2017 04:15) (89/52 - 119/77)  BP(mean): --  RR: 17 (2017 04:00) (16 - 18)  SpO2: 91% (2017 04:00) (90% - 96%)  I&O's Summary    2017 07:01  -  2017 11:43  --------------------------------------------------------  IN: 0 mL / OUT: 0 mL / NET: 0 mL      MEDICATIONS  (STANDING):  nicotine - 21 mG/24Hr(s) Patch 1 patch Transdermal daily  oxyCODONE  ER Tablet 40 milliGRAM(s) Oral every 12 hours  QUEtiapine 100 milliGRAM(s) Oral at bedtime    MEDICATIONS  (PRN):  ALPRAZolam 1 milliGRAM(s) Oral two times a day PRN anxiety  nicotine  Polacrilex Gum 4 milliGRAM(s) Oral four times a day PRN nicotine with drawal  nicotine - Inhaler 1 Each Inhalation every 4 hours PRN nicotine withdrawal  oxyCODONE    IR 15 milliGRAM(s) Oral four times a day PRN Severe Pain (7 - 10)    LABS:                        12.4   5.9   )-----------( 244      ( 10 Nov 2017 14:32 )             37.0     11-10    139  |  102  |  12  ----------------------------<  84  4.1   |  23  |  0.54    Ca    9.9      10 Nov 2017 14:32    TPro  6.8  /  Alb  3.7  /  TBili  0.4  /  DBili  x   /  AST  20  /  ALT  12  /  AlkPhos  242<H>  11-10      Urinalysis Basic - ( 10 Nov 2017 17:16 )    Color: Yellow / Appearance: SL Turbid / S.019 / pH: x  Gluc: x / Ketone: Negative  / Bili: Negative / Urobili: Negative   Blood: x / Protein: Trace / Nitrite: Negative   Leuk Esterase: Small / RBC: x / WBC 3-5 /HPF   Sq Epi: x / Non Sq Epi: Few /HPF / Bacteria: x      CAPILLARY BLOOD GLUCOSE            Urinalysis Basic - ( 10 Nov 2017 17:16 )    Color: Yellow / Appearance: SL Turbid / S.019 / pH: x  Gluc: x / Ketone: Negative  / Bili: Negative / Urobili: Negative   Blood: x / Protein: Trace / Nitrite: Negative   Leuk Esterase: Small / RBC: x / WBC 3-5 /HPF   Sq Epi: x / Non Sq Epi: Few /HPF / Bacteria: x      Consultant(s) Notes Reviewed:  [x ] YES  [ ] NO    PHYSICAL EXAM:  GENERAL: NAD, very agitated, un cooperative   HEAD:  Atraumatic, Normocephalic  EYES: EOMI, PERRLA, conjunctiva and sclera clear  ENMT: No tonsillar erythema, exudates, or enlargement; Moist mucous membranes, Good dentition, No lesions  NECK: Supple, No JVD, Normal thyroid  NERVOUS SYSTEM:  Alert & Oriented X3, No focal deficit   CHEST/LUNG: Good air entry bilateral with no  rales, rhonchi, wheezing, or rubs  HEART: Regular rate and rhythm; No murmurs, rubs, or gallops  ABDOMEN: Soft, Nontender, Nondistended; Bowel sounds present  EXTREMITIES:  2+ Peripheral Pulses, No clubbing, cyanosis, or edema  SKIN: No rashes or lesions    Care Discussed with Consultants/Other Providers [ x] YES  [ ] NO INTERVAL HPI/OVERNIGHT EVENTS: Seen and examined.  I am going home. This hospital is dirty   Vital Signs Last 24 Hrs  T(C): 31.7 (2017 04:00), Max: 37.3 (10 Nov 2017 18:46)  T(F): 89 (2017 04:00), Max: 99.1 (10 Nov 2017 18:46)  HR: 96 (2017 04:15) (89 - 109)  BP: 90/60 (2017 04:15) (89/52 - 119/77)  BP(mean): --  RR: 17 (2017 04:00) (16 - 18)  SpO2: 91% (2017 04:00) (90% - 96%)  I&O's Summary    2017 07:01  -  2017 11:43  --------------------------------------------------------  IN: 0 mL / OUT: 0 mL / NET: 0 mL      MEDICATIONS  (STANDING):  nicotine - 21 mG/24Hr(s) Patch 1 patch Transdermal daily  oxyCODONE  ER Tablet 40 milliGRAM(s) Oral every 12 hours  QUEtiapine 100 milliGRAM(s) Oral at bedtime    MEDICATIONS  (PRN):  ALPRAZolam 1 milliGRAM(s) Oral two times a day PRN anxiety  nicotine  Polacrilex Gum 4 milliGRAM(s) Oral four times a day PRN nicotine with drawal  nicotine - Inhaler 1 Each Inhalation every 4 hours PRN nicotine withdrawal  oxyCODONE    IR 15 milliGRAM(s) Oral four times a day PRN Severe Pain (7 - 10)    LABS:                        12.4   5.9   )-----------( 244      ( 10 Nov 2017 14:32 )             37.0     11-10    139  |  102  |  12  ----------------------------<  84  4.1   |  23  |  0.54    Ca    9.9      10 Nov 2017 14:32    TPro  6.8  /  Alb  3.7  /  TBili  0.4  /  DBili  x   /  AST  20  /  ALT  12  /  AlkPhos  242<H>  11-10      Urinalysis Basic - ( 10 Nov 2017 17:16 )    Color: Yellow / Appearance: SL Turbid / S.019 / pH: x  Gluc: x / Ketone: Negative  / Bili: Negative / Urobili: Negative   Blood: x / Protein: Trace / Nitrite: Negative   Leuk Esterase: Small / RBC: x / WBC 3-5 /HPF   Sq Epi: x / Non Sq Epi: Few /HPF / Bacteria: x      CAPILLARY BLOOD GLUCOSE            Urinalysis Basic - ( 10 Nov 2017 17:16 )    Color: Yellow / Appearance: SL Turbid / S.019 / pH: x  Gluc: x / Ketone: Negative  / Bili: Negative / Urobili: Negative   Blood: x / Protein: Trace / Nitrite: Negative   Leuk Esterase: Small / RBC: x / WBC 3-5 /HPF   Sq Epi: x / Non Sq Epi: Few /HPF / Bacteria: x      Consultant(s) Notes Reviewed:  [x ] YES  [ ] NO    PHYSICAL EXAM:  GENERAL: NAD, very agitated, un cooperative   HEAD:  Atraumatic, Normocephalic  EYES: EOMI, PERRLA, conjunctiva and sclera clear  ENMT: No tonsillar erythema, exudates, or enlargement; Moist mucous membranes, Good dentition, No lesions  NECK: Supple, No JVD, Normal thyroid  NERVOUS SYSTEM:  Alert & Oriented X3, No focal deficit ,gets confused in between   CHEST/LUNG: Good air entry bilateral with no  rales, rhonchi, wheezing, or rubs  HEART: Regular rate and rhythm; No murmurs, rubs, or gallops  ABDOMEN: Soft, Nontender, Nondistended; Bowel sounds present  EXTREMITIES:  2+ Peripheral Pulses, No clubbing, cyanosis, or edema  SKIN: No rashes or lesions    Care Discussed with Consultants/Other Providers [ x] YES  [ ] NO

## 2017-11-11 NOTE — DISCHARGE NOTE ADULT - CONDITIONS AT DISCHARGE
pt alert with confusion, spouse at bedside taking patient home AMA, skin intact, ivl removed, pt ambulatory with no distress

## 2017-11-11 NOTE — DISCHARGE NOTE ADULT - PLAN OF CARE
Controlled hormone levels Follow up with Endocrine take medications as prescribed  Return for any worsening symptoms No exacerbation Continue medication as directed  Follow up chest CT as out patient  Follow up with your jarekotr in one week  Return for any worsening symptoms

## 2017-11-11 NOTE — BEHAVIORAL HEALTH ASSESSMENT NOTE - SUMMARY
58 y/o F with no significant PPHx reported despite prescriptions for Seroquel for sleep and Xanax for anxiety, no engagement with psychiatric treatment, no h/o SA, presented for treatment of hyperthyroidism with ongoing thyrotoxicosis per Endo and seen by [facility] for agitated behavior and threatening statements. Pt denying all symptoms of mood disturbance or psychosis. Was A&Ox4/4 on initial presentation and then was encountered confused, disoriented, attempting to elope, and unable to explain medical condition. Report of striking staff. Report of threatening statement; collateral does not support h/o violence or access to firearms. High probability of delirium 2/2 [facility] given thyrotoxicosis.

## 2017-11-11 NOTE — PROVIDER CONTACT NOTE (OTHER) - SITUATION
patient upset when asked to surrender prescription pills at bedside. Security present. patient stated that "she will come back with her  with their guns"

## 2017-11-11 NOTE — BEHAVIORAL HEALTH ASSESSMENT NOTE - AXIS III
SLE, multiple back surgeries, hyperthyroidism with ongoing treatment and evaluation for possible malignancy

## 2017-11-11 NOTE — DIETITIAN INITIAL EVALUATION ADULT. - NS AS NUTRI INTERV MEALS SNACK
Encourage PO intake with all meals. Provide food preferences within therapeutic diet when requested. Reviewed alternate menu options and menu ordering procedure. Pt made aware RD remains available as needed.

## 2017-11-11 NOTE — BEHAVIORAL HEALTH ASSESSMENT NOTE - NSBHCHARTREVIEWVS_PSY_A_CORE FT
ICU Vital Signs Last 24 Hrs  T(C): 36.8 (11 Nov 2017 14:19), Max: 37.3 (10 Nov 2017 18:46)  T(F): 98.2 (11 Nov 2017 14:19), Max: 99.1 (10 Nov 2017 18:46)  HR: 112 (11 Nov 2017 14:19) (89 - 112)  BP: 137/86 (11 Nov 2017 14:19) (89/52 - 137/86)  RR: 18 (11 Nov 2017 14:19) (17 - 18)  SpO2: 96% (11 Nov 2017 14:19) (90% - 96%)

## 2017-11-11 NOTE — CHART NOTE - NSCHARTNOTEFT_GEN_A_CORE
MEDICINE NP    Notified by RN patient wanting to elope for cigarrette smoke .  This is a 57 F Smoker with PMH of lupus,Multiple Back surgeries was advised by her Gastroenterologist DR Jinny Castellanos to come to ED as has been non complaint with her appointments. She has been having  weight loss ,loss of appetite ,diarrhea and night sweats for 5 months. Associated w/ weight loss ~50 lbs, nausea, diarrhea, chills, intermittent LLQ abd pain, and insomnia. Dx hyperthyroidism last week, referred to [facility] for treatment, left AMA 4 days ago. Seen and examined patient at bedside. Patient is alert, NAD. patient wants to leave AMA but lacks capacity.      VITAL SIGNS:  T(C): 31.7 (11-11-17 @ 04:00), Max: 37.3 (11-10-17 @ 18:46)  HR: 96 (11-11-17 @ 04:15) (89 - 109)  BP: 90/60 (11-11-17 @ 04:15) (89/52 - 119/77)  RR: 17 (11-11-17 @ 04:00) (16 - 18)  SpO2: 91% (11-11-17 @ 04:00) (90% - 96%)  Wt(kg): --      LABORATORY:                          12.4   5.9   )-----------( 244      ( 10 Nov 2017 14:32 )             37.0       11-10    139  |  102  |  12  ----------------------------<  84  4.1   |  23  |  0.54    Ca    9.9      10 Nov 2017 14:32    TPro  6.8  /  Alb  3.7  /  TBili  0.4  /  DBili  x   /  AST  20  /  ALT  12  /  AlkPhos  242<H>  11-10          MICROBIOLOGY:           RADIOLOGY:          PHYSICAL EXAM:    Constitutional: AOx3. NAD.    Respiratory: clear lungs bilaterally. No wheezing, rhonchi, or crackles.    Cardiovascular: S1 S2. No murmurs.    Gastrointestinal: BS X4 active. soft. nontender.    Extremities/Vascular: +2 pulses bilaterally. No BLE edema.      ASSESSMENT/PLAN:   HPI:  57 F Smoker with PMH of lupus,Multiple Back surgeries was advised by her Gastroenterologist DR Jinny Castellanos to come to ED as has been non complaint with her appointments. She has been having  weight loss ,loss of appetite ,diarrhea and night sweats for 5 months. Associated w/ weight loss ~50 lbs, nausea, diarrhea, chills, intermittent LLQ abd pain, and insomnia. Dx hyperthyroidism last week, referred to [facility] for treatment, left AMA 4 days ago. Wants to leave AMA        1) Want to leave AMA  -ordered 1:1  -Called psych consult  -F/U primary team in AM

## 2017-11-11 NOTE — CONSULT NOTE ADULT - PROBLEM SELECTOR RECOMMENDATION 9
- FT4 is pending, suspect acute thyrotoxicosis. Does not appear to have thyroid storm. No CHF, no arrythmia, no jaundice, no high temperature.  - Recommend Atenolol 20 mg QD given tachycardia but hold for SBP<100  - Recommend check TSH Receptor Ab and TSI in am  - No nodules on exam, low suspicion for nodules, consider BANSAL uptake/scan when clinically more stable without delirium  - Seen by psychiatry and patient determined to have no medical decision making capacity at this time  - Will speak with  when FT4 levels return    Outpatient Plan:  - Recommend Atenolol 25mg QD if SBP>100, hold if SBP<100  - Methimazole therapy based on FT4 levels, to be determined. Risks of liver injury and agranulocytes explained.    D/w attending    Vickie Balderas DO  Endocrine Fellow   Pager: [phone]. - FT4 is pending, suspect acute thyrotoxicosis. Does not appear to have thyroid storm. No CHF, no arrythmia, no jaundice, no high temperature.  - Recommend Atenolol 20 mg QD given tachycardia but hold for SBP<100  - Recommend check TSH Receptor Ab and TSI in am  - No nodules on exam, low suspicion for nodules, consider BANSAL uptake/scan when clinically more stable without delirium. Avoid IV dye in the interim.  - Seen by psychiatry and patient determined to have no medical decision making capacity at this time  - Will speak with  when FT4 levels return    Outpatient Plan:  - Recommend Atenolol 25mg QD if SBP>100, hold if SBP<100  - Methimazole therapy based on FT4 levels, to be determined. Risks of liver injury and agranulocytes explained.    D/w attending    Vickie Balderas DO  Endocrine Fellow   Pager: [phone]. - FT4 is pending, suspect acute thyrotoxicosis. Does not appear to have thyroid storm. No CHF, no arrythmia, no jaundice, no high temperature.  - Recommend Atenolol 20 mg QD given tachycardia but hold for SBP<100  - Recommend check TSH Receptor Ab and TSI and TT3 in am  - No nodules on exam, low suspicion for nodules, consider BANSAL uptake/scan when clinically more stable without delirium. Avoid IV dye in the interim.  - Seen by psychiatry and patient determined to have no medical decision making capacity at this time  - Will speak with  when FT4 levels return    Outpatient Plan:  - Recommend Atenolol 25mg QD if SBP>100, hold if SBP<100  - Methimazole therapy based on FT4 levels, to be determined. Risks of liver injury and agranulocytes explained.    D/w attending    Vickie Balderas DO  Endocrine Fellow   Pager: [phone]. - FT4 is pending, suspect acute thyrotoxicosis. Does not appear to have thyroid storm. No CHF, no arrythmia, no jaundice, no high temperature.  - Recommend Propranolol 10mg q6 given tachycardia but hold for SBP<90  - Recommend check TSH Receptor Ab and TSI and TT3 in am  - No nodules on exam, low suspicion for nodules, consider BANSAL uptake/scan when clinically more stable without delirium in the outpatient setting for definitive management  - Seen by psychiatry and patient determined to have no medical decision making capacity at this time  - Will speak with  when FT4 levels return    Outpatient Plan:  - Recommend Atenolol 25mg QD if SBP>100, hold if SBP<100  - Methimazole therapy based on FT4 levels, to be determined. Risks of liver injury and agranulocytes explained.    D/w attending    Vickie Balderas DO  Endocrine Fellow   Pager: [phone].

## 2017-11-11 NOTE — BEHAVIORAL HEALTH ASSESSMENT NOTE - NSBHVIOLRISKFACTORS_PSY_A_CORE
Affective dysregulation/Impulsivity/Violent ideation/threat/speech/Irritability/Elopement history or risk

## 2017-11-11 NOTE — DIETITIAN INITIAL EVALUATION ADULT. - PHYSICAL APPEARANCE
underweight/unable to conduct nutrition focused physical exam at this time as pt refused remainder of interview stating "I'm getting out of here." Pt appears thin with temporal wasting on visual assessment.

## 2017-11-11 NOTE — PROGRESS NOTE ADULT - ASSESSMENT
57 F Smoker with PMH of lupus,Multiple Back surgeries was advised by her Gastroenterologist DR Jinny Castellanos to come to ED as has been non complaint with her appointments. She has been having  weight loss ,loss of appetite ,diarrhea and night sweats for 5 months. Associated w/ weight loss ~50 lbs, nausea, diarrhea, chills, intermittent LLQ abd pain, and insomnia. Dx hyperthyroidism last week, referred to [facility] for treatment, left AMA 4 days ago. Per Dr. Castellanos (GI) who relates an extensive w/u that is significant for findings of hyperthyroidism, CBD dilitation, lung nodules, and retroperitoneal adenopthy (on CT chest and a/p, respectively). Pt has been non-compliant w/ outpt evals, including a missed appt for ERCP yesterday. Dr. Castellanos recommends she be admitted for further evaluation.       Problem/Plan - 1:  ·  Problem: Hyperthyroidism.  Plan: TSH is low  but Free T4 pending. Endocrinology consulted.     Problem/Plan - 2:  ·  Problem: Lung nodules.  Plan: Will get CT chest records or repeat here . Will get Ct head also.      Problem/Plan - 3:  ·  Problem: Common bile duct dilatation.  Plan: Awaiting ERCP , GI Aware.      Problem/Plan - 4:  ·  Problem: Weight loss.  Plan: Malignancy work up as well R/O Hyperthyroidism as cause.      Problem/Plan - 5:  ·  Problem: Other chronic back pain.  Plan: On Oxycontin and Oxycodone .      Problem/Plan - 6:  Problem: Lupus (systemic lupus erythematosus). Plan: Stable and not on any meds.   Problem/Plan - 7:  . Problem ; Smoker ; Nicotine Patch.        PT  wants to Leave against medical advise . Psychiatry consulted. Attending will see her soon.     D/W pts  in detail. 57 F Smoker with PMH of lupus,Multiple Back surgeries was advised by her Gastroenterologist DR Jinny Castellanos to come to ED as has been non complaint with her appointments. She has been having  weight loss ,loss of appetite ,diarrhea and night sweats for 5 months. Associated w/ weight loss ~50 lbs, nausea, diarrhea, chills, intermittent LLQ abd pain, and insomnia. Dx hyperthyroidism last week, referred to [facility] for treatment, left AMA 4 days ago. Per Dr. Castellanos (GI) who relates an extensive w/u that is significant for findings of hyperthyroidism, CBD dilitation, lung nodules, and retroperitoneal adenopthy (on CT chest and a/p, respectively). Pt has been non-compliant w/ outpt evals, including a missed appt for ERCP yesterday. Dr. Castellanos recommends she be admitted for further evaluation.       Problem/Plan - 1:  ·  Problem: Hyperthyroidism.  Plan: TSH is low  but Free T4 pending. D/W Endocrinology fellow and will start pt on BB for now . Further work up per them.      Problem/Plan - 2:  ·  Problem: Lung nodules.  Plan: Will get CT chest records or repeat here . Will get Ct head also.      Problem/Plan - 3:  ·  Problem: Common bile duct dilatation.  Plan: Awaiting ERCP , GI Aware.      Problem/Plan - 4:  ·  Problem: Weight loss.  Plan: Malignancy work up as well R/O Hyperthyroidism as can cause  weight loss .     Problem/Plan - 5:  ·  Problem: Other chronic back pain.  Plan: On Oxycontin and Oxycodone .      Problem/Plan - 6:  Problem: Lupus (systemic lupus erythematosus). Plan: Stable and not on any meds.   Problem/Plan - 7:  . Problem ; Smoker ; Nicotine Patch.    Problem/Plan -8:  . Problem :  Delirium; Sec to  hyperthyroidism . RX started and psychiatry consulted. D/W Attending and doesnt have capacity to leave AMA.        PT  wants to Leave against medical advise . Psychiatry consulted. Attending will see her soon.     D/W pts  in detail twice over the phone.

## 2017-11-11 NOTE — CHART NOTE - NSCHARTNOTEFT_GEN_A_CORE
Upon Nutritional Assessment by the Registered Dietitian your patient was determined to meet criteria / has evidence of the following diagnosis/diagnoses:          [ ]  Mild Protein Calorie Malnutrition        [ ]  Moderate Protein Calorie Malnutrition        [ x ] Severe Protein Calorie Malnutrition        [ ] Unspecified Protein Calorie Malnutrition        [ ] Underweight / BMI <19        [ ] Morbid Obesity / BMI > 40      Findings as based on:  [ x ] Comprehensive nutrition assessment - reported minimal PO intake associated with 50 pounds (30%) wt loss in 2 months, appears with temporal wasting  [ ] Nutrition Focused Physical Exam  [ ] Other:       Nutrition Plan/Recommendations:    Encourage PO intake with all meals. Provide food preferences within therapeutic diet when requested.       PROVIDER Section:     By signing this assessment you are acknowledging and agree with the diagnosis/diagnoses assigned by the Registered Dietitian    Comments:

## 2017-11-11 NOTE — DISCHARGE NOTE ADULT - CARE PLAN
Principal Discharge DX:	Hyperthyroidism  Goal:	Controlled hormone levels  Instructions for follow-up, activity and diet:	Follow up with Endocrine take medications as prescribed  Return for any worsening symptoms  Secondary Diagnosis:	Lupus (systemic lupus erythematosus)  Goal:	No exacerbation  Instructions for follow-up, activity and diet:	Continue medication as directed  Follow up chest CT as out patient  Follow up with your docotr in one week  Return for any worsening symptoms

## 2017-11-11 NOTE — BEHAVIORAL HEALTH ASSESSMENT NOTE - CASE SUMMARY
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 Initially with resident pt appeared more alert, coherent, more organized thought process, able to explain details of admission. With writer pt more confused, disorganized thought process, unable to explain risks/benefits of tx, doesn't appreciate severity of her condition, repeatedly stated she doesn't trust the doctors here, and she wants to go to another facility. Pt couldn't explain where she was going next, or treatment plan. pt denies mood symptoms, no si/hi, no paranoia, hallucinations. Pt does not have capacity to leave ama, pt does not have capacity to refuse medications. would start 1;1 for elopement risk, cont seroquel, cont xanax.

## 2017-11-11 NOTE — DISCHARGE NOTE ADULT - MEDICATION SUMMARY - MEDICATIONS TO TAKE
I will START or STAY ON the medications listed below when I get home from the hospital:    oxyCODONE 20 mg oral tablet  -- 1 tab(s) by mouth 4 times a day, As Needed  -- Indication: For Pain    OxyCONTIN 60 mg oral tablet, extended release  -- 1 tab(s) by mouth 2 times a day, As Needed  -- Indication: For Pain    propranolol 10 mg oral tablet  -- 1 tab(s) by mouth every 6 hours for heart rate greater than 100  -- Indication: For Tachycardia (fast heart rate)    SEROquel 100 mg oral tablet  -- 1 tab(s) by mouth once a day (at bedtime)  -- Indication: For Delirium due to another medical condition    methIMAzole 10 mg oral tablet  -- 1 tab(s) by mouth 2 times a day MDD:2  -- Indication: For Hyperthyroidism    Xanax 0.5 mg oral tablet  -- 1 tab(s) by mouth once a day (at bedtime), As Needed  -- Indication: For Anxiety    ocular lubricant ophthalmic solution  -- 1 drop(s) in each affected eye , As Needed  -- Indication: For Dry eyes    omeprazole 40 mg oral delayed release capsule  -- 1 cap(s) by mouth once a day  -- Indication: For Need for prophylactic measure

## 2017-11-11 NOTE — DIETITIAN INITIAL EVALUATION ADULT. - ENERGY NEEDS
Ht:5ft6in, Wt:113.5pounds, BMI:18.9,   IBW:130pounds (+/-10%), 87%IBW  Pertinent Information: pt admitted with hyperthyroid, lung nodules, CBD dilation, wants to leave AMA as per chart. No pressure ulcers or edema noted.

## 2017-11-12 LAB — T3 SERPL-MCNC: 488 NG/DL — HIGH (ref 80–200)

## 2017-11-13 RX ORDER — OXYCODONE HYDROCHLORIDE 5 MG/1
0 TABLET ORAL
Qty: 0 | Refills: 0 | COMMUNITY

## 2017-11-13 RX ORDER — ZOLPIDEM TARTRATE 10 MG/1
0 TABLET ORAL
Qty: 0 | Refills: 0 | COMMUNITY

## 2017-11-13 RX ORDER — OXYCODONE HYDROCHLORIDE 5 MG/1
1 TABLET ORAL
Qty: 0 | Refills: 0 | COMMUNITY

## 2017-11-13 RX ORDER — OMEPRAZOLE 10 MG/1
1 CAPSULE, DELAYED RELEASE ORAL
Qty: 0 | Refills: 0 | COMMUNITY

## 2017-11-13 RX ORDER — ALPRAZOLAM 0.25 MG
1 TABLET ORAL
Qty: 0 | Refills: 0 | COMMUNITY

## 2017-11-13 RX ORDER — QUETIAPINE FUMARATE 200 MG/1
1 TABLET, FILM COATED ORAL
Qty: 0 | Refills: 0 | COMMUNITY

## 2017-11-13 RX ORDER — QUETIAPINE FUMARATE 200 MG/1
0 TABLET, FILM COATED ORAL
Qty: 0 | Refills: 0 | COMMUNITY

## 2017-11-13 RX ORDER — ALPRAZOLAM 0.25 MG
0 TABLET ORAL
Qty: 0 | Refills: 0 | COMMUNITY

## 2017-11-14 LAB
AMPHETAMINES BLD QL SCN: NEGATIVE — SIGNIFICANT CHANGE UP
BARBITURATES SERPLBLD QL: NEGATIVE — SIGNIFICANT CHANGE UP
BENZODIAZAPINES, SERUM: POSITIVE
CANNABINOIDS SERPLBLD QL SCN: NEGATIVE — SIGNIFICANT CHANGE UP
COCAINE+BZE SERPLBLD QL SCN: NEGATIVE — SIGNIFICANT CHANGE UP
METHADONE SERPL-MCNC: NEGATIVE — SIGNIFICANT CHANGE UP
OPIATES SERPL QL: NEGATIVE — SIGNIFICANT CHANGE UP
PCP BLD QL SCN: NEGATIVE — SIGNIFICANT CHANGE UP
TSH RECEP AB FLD-ACNC: 32.04 IU/L — HIGH (ref 0–1.75)

## 2017-11-24 PROBLEM — L93.0 DISCOID LUPUS ERYTHEMATOSUS: Chronic | Status: ACTIVE | Noted: 2017-11-10

## 2017-12-05 ENCOUNTER — APPOINTMENT (OUTPATIENT)
Dept: GASTROENTEROLOGY | Facility: CLINIC | Age: 57
End: 2017-12-05

## 2017-12-07 ENCOUNTER — APPOINTMENT (OUTPATIENT)
Dept: GASTROENTEROLOGY | Facility: CLINIC | Age: 57
End: 2017-12-07
Payer: COMMERCIAL

## 2017-12-07 VITALS
WEIGHT: 112 LBS | HEIGHT: 66 IN | BODY MASS INDEX: 18 KG/M2 | TEMPERATURE: 98 F | DIASTOLIC BLOOD PRESSURE: 60 MMHG | SYSTOLIC BLOOD PRESSURE: 100 MMHG

## 2017-12-07 PROCEDURE — 99214 OFFICE O/P EST MOD 30 MIN: CPT

## 2017-12-12 LAB
ALBUMIN SERPL ELPH-MCNC: 3.5 G/DL
ALP BLD-CCNC: 275 U/L
ALT SERPL-CCNC: <4 U/L
ANION GAP SERPL CALC-SCNC: 10 MMOL/L
AST SERPL-CCNC: 22 U/L
BASOPHILS # BLD AUTO: 0.02 K/UL
BASOPHILS NFR BLD AUTO: 0.3 %
BILIRUB SERPL-MCNC: 0.2 MG/DL
BUN SERPL-MCNC: 7 MG/DL
CALCIUM SERPL-MCNC: 9.1 MG/DL
CHLORIDE SERPL-SCNC: 101 MMOL/L
CO2 SERPL-SCNC: 22 MMOL/L
CREAT SERPL-MCNC: 0.59 MG/DL
EOSINOPHIL # BLD AUTO: 0.08 K/UL
EOSINOPHIL NFR BLD AUTO: 1.3 %
GLUCOSE SERPL-MCNC: 86 MG/DL
HCT VFR BLD CALC: 39.3 %
HGB BLD-MCNC: 12.8 G/DL
IMM GRANULOCYTES NFR BLD AUTO: 0.3 %
INR PPP: 0.94 RATIO
LYMPHOCYTES # BLD AUTO: 1.69 K/UL
LYMPHOCYTES NFR BLD AUTO: 26.8 %
MAN DIFF?: NORMAL
MCHC RBC-ENTMCNC: 29.6 PG
MCHC RBC-ENTMCNC: 32.6 GM/DL
MCV RBC AUTO: 91 FL
MONOCYTES # BLD AUTO: 0.45 K/UL
MONOCYTES NFR BLD AUTO: 7.1 %
NEUTROPHILS # BLD AUTO: 4.05 K/UL
NEUTROPHILS NFR BLD AUTO: 64.2 %
PLATELET # BLD AUTO: 299 K/UL
POTASSIUM SERPL-SCNC: 4.5 MMOL/L
PROT SERPL-MCNC: 7 G/DL
PT BLD: 10.6 SEC
RBC # BLD: 4.32 M/UL
RBC # FLD: 16.9 %
SODIUM SERPL-SCNC: 133 MMOL/L
T3 SERPL-MCNC: 189 NG/DL
T4 SERPL-MCNC: 5.6 UG/DL
TSH SERPL-ACNC: <0.01 UIU/ML
WBC # FLD AUTO: 6.31 K/UL

## 2017-12-13 ENCOUNTER — OUTPATIENT (OUTPATIENT)
Dept: OUTPATIENT SERVICES | Facility: HOSPITAL | Age: 57
LOS: 1 days | End: 2017-12-13
Payer: COMMERCIAL

## 2017-12-13 ENCOUNTER — APPOINTMENT (OUTPATIENT)
Dept: GASTROENTEROLOGY | Facility: HOSPITAL | Age: 57
End: 2017-12-13

## 2017-12-13 ENCOUNTER — RESULT REVIEW (OUTPATIENT)
Age: 57
End: 2017-12-13

## 2017-12-13 DIAGNOSIS — K83.8 OTHER SPECIFIED DISEASES OF BILIARY TRACT: ICD-10-CM

## 2017-12-13 DIAGNOSIS — Z98.890 OTHER SPECIFIED POSTPROCEDURAL STATES: Chronic | ICD-10-CM

## 2017-12-13 DIAGNOSIS — R93.3 ABNORMAL FINDINGS ON DIAGNOSTIC IMAGING OF OTHER PARTS OF DIGESTIVE TRACT: ICD-10-CM

## 2017-12-13 PROCEDURE — 43242 EGD US FINE NEEDLE BX/ASPIR: CPT

## 2017-12-13 PROCEDURE — 88173 CYTOPATH EVAL FNA REPORT: CPT | Mod: 26

## 2017-12-13 PROCEDURE — 88173 CYTOPATH EVAL FNA REPORT: CPT

## 2017-12-13 PROCEDURE — 88305 TISSUE EXAM BY PATHOLOGIST: CPT | Mod: 26

## 2017-12-13 PROCEDURE — 88305 TISSUE EXAM BY PATHOLOGIST: CPT

## 2017-12-13 PROCEDURE — 43242 EGD US FINE NEEDLE BX/ASPIR: CPT | Mod: GC

## 2018-01-26 ENCOUNTER — OTHER (OUTPATIENT)
Age: 58
End: 2018-01-26

## 2018-03-15 ENCOUNTER — CLINICAL ADVICE (OUTPATIENT)
Age: 58
End: 2018-03-15

## 2018-04-25 ENCOUNTER — APPOINTMENT (OUTPATIENT)
Dept: GASTROENTEROLOGY | Facility: CLINIC | Age: 58
End: 2018-04-25

## 2018-05-29 ENCOUNTER — APPOINTMENT (OUTPATIENT)
Dept: GASTROENTEROLOGY | Facility: CLINIC | Age: 58
End: 2018-05-29
Payer: COMMERCIAL

## 2018-05-29 VITALS
BODY MASS INDEX: 17.04 KG/M2 | HEART RATE: 109 BPM | OXYGEN SATURATION: 97 % | SYSTOLIC BLOOD PRESSURE: 98 MMHG | HEIGHT: 66 IN | WEIGHT: 106 LBS | DIASTOLIC BLOOD PRESSURE: 60 MMHG | TEMPERATURE: 97.9 F

## 2018-05-29 DIAGNOSIS — R91.1 SOLITARY PULMONARY NODULE: ICD-10-CM

## 2018-05-29 DIAGNOSIS — K83.8 OTHER SPECIFIED DISEASES OF BILIARY TRACT: ICD-10-CM

## 2018-05-29 PROCEDURE — 99214 OFFICE O/P EST MOD 30 MIN: CPT

## 2018-06-20 ENCOUNTER — APPOINTMENT (OUTPATIENT)
Dept: GASTROENTEROLOGY | Facility: CLINIC | Age: 58
End: 2018-06-20
Payer: COMMERCIAL

## 2018-06-20 VITALS
BODY MASS INDEX: 17.04 KG/M2 | HEART RATE: 112 BPM | WEIGHT: 106 LBS | HEIGHT: 66 IN | OXYGEN SATURATION: 96 % | SYSTOLIC BLOOD PRESSURE: 100 MMHG | DIASTOLIC BLOOD PRESSURE: 60 MMHG | TEMPERATURE: 98.4 F

## 2018-06-20 DIAGNOSIS — R10.9 UNSPECIFIED ABDOMINAL PAIN: ICD-10-CM

## 2018-06-20 PROCEDURE — 99214 OFFICE O/P EST MOD 30 MIN: CPT

## 2018-06-28 ENCOUNTER — CHART COPY (OUTPATIENT)
Age: 58
End: 2018-06-28

## 2018-08-28 ENCOUNTER — APPOINTMENT (OUTPATIENT)
Dept: GASTROENTEROLOGY | Facility: CLINIC | Age: 58
End: 2018-08-28
Payer: COMMERCIAL

## 2018-08-28 VITALS
TEMPERATURE: 98 F | DIASTOLIC BLOOD PRESSURE: 60 MMHG | WEIGHT: 101 LBS | SYSTOLIC BLOOD PRESSURE: 96 MMHG | HEART RATE: 127 BPM | HEIGHT: 66 IN | BODY MASS INDEX: 16.23 KG/M2 | OXYGEN SATURATION: 94 %

## 2018-08-28 DIAGNOSIS — R93.3 ABNORMAL FINDINGS ON DIAGNOSTIC IMAGING OF OTHER PARTS OF DIGESTIVE TRACT: ICD-10-CM

## 2018-08-28 DIAGNOSIS — E05.90 THYROTOXICOSIS, UNSPECIFIED W/OUT THYROTOXIC CRISIS OR STORM: ICD-10-CM

## 2018-08-28 PROCEDURE — 99213 OFFICE O/P EST LOW 20 MIN: CPT

## 2018-09-05 ENCOUNTER — APPOINTMENT (OUTPATIENT)
Dept: GASTROENTEROLOGY | Facility: CLINIC | Age: 58
End: 2018-09-05
Payer: COMMERCIAL

## 2018-09-05 VITALS
OXYGEN SATURATION: 97 % | HEART RATE: 112 BPM | BODY MASS INDEX: 16.88 KG/M2 | WEIGHT: 105 LBS | SYSTOLIC BLOOD PRESSURE: 110 MMHG | TEMPERATURE: 97.7 F | DIASTOLIC BLOOD PRESSURE: 68 MMHG | HEIGHT: 66 IN

## 2018-09-05 DIAGNOSIS — R19.7 DIARRHEA, UNSPECIFIED: ICD-10-CM

## 2018-09-05 PROCEDURE — 99214 OFFICE O/P EST MOD 30 MIN: CPT

## 2018-09-06 LAB
ALBUMIN SERPL ELPH-MCNC: 4 G/DL
ALP BLD-CCNC: 310 U/L
ALT SERPL-CCNC: 15 U/L
ANION GAP SERPL CALC-SCNC: 14 MMOL/L
AST SERPL-CCNC: 30 U/L
BASOPHILS # BLD AUTO: 0.01 K/UL
BASOPHILS NFR BLD AUTO: 0.2 %
BILIRUB SERPL-MCNC: 0.6 MG/DL
BUN SERPL-MCNC: 9 MG/DL
CALCIUM SERPL-MCNC: 10.6 MG/DL
CHLORIDE SERPL-SCNC: 102 MMOL/L
CO2 SERPL-SCNC: 21 MMOL/L
CREAT SERPL-MCNC: 0.48 MG/DL
EOSINOPHIL # BLD AUTO: 0.05 K/UL
EOSINOPHIL NFR BLD AUTO: 1.2 %
GLUCOSE SERPL-MCNC: 83 MG/DL
HCT VFR BLD CALC: 40.2 %
HGB BLD-MCNC: 13 G/DL
IMM GRANULOCYTES NFR BLD AUTO: 0 %
LYMPHOCYTES # BLD AUTO: 1.18 K/UL
LYMPHOCYTES NFR BLD AUTO: 28.9 %
MAN DIFF?: NORMAL
MCHC RBC-ENTMCNC: 27.4 PG
MCHC RBC-ENTMCNC: 32.3 GM/DL
MCV RBC AUTO: 84.6 FL
MONOCYTES # BLD AUTO: 0.46 K/UL
MONOCYTES NFR BLD AUTO: 11.2 %
NEUTROPHILS # BLD AUTO: 2.39 K/UL
NEUTROPHILS NFR BLD AUTO: 58.5 %
PLATELET # BLD AUTO: 247 K/UL
POTASSIUM SERPL-SCNC: 4.4 MMOL/L
PROT SERPL-MCNC: 7.7 G/DL
RBC # BLD: 4.75 M/UL
RBC # FLD: 15.3 %
SODIUM SERPL-SCNC: 137 MMOL/L
T3 SERPL-MCNC: >650 NG/DL
T4 SERPL-MCNC: 21.9 UG/DL
TSH SERPL-ACNC: <0.01 UIU/ML
WBC # FLD AUTO: 4.09 K/UL

## 2022-09-29 NOTE — DIETITIAN INITIAL EVALUATION ADULT. - PROBLEM SELECTOR PLAN 2
Therapist offered pt session and she declined. Mother was present.   09/29/22 1640   Assessment   Session Type Individual   Assessment Type Deferred   Reason Deferred Patient/family declined   Caregiver Mom   Plan No further sessions planned at this time   Therapist further explored with pt and asked if she preferred to request services if she wanted them and pt agreed to this.  Plan: No further sessions/attempts unless pt/mother requests them via RN. No other needs identified today.    Dionna Peralta, MT-BC  Music Therapist Board Certified  Phone [phone]     Will get CT chest records or repeat here .

## 2023-08-31 NOTE — DISCHARGE NOTE ADULT - CARE PROVIDERS DIRECT ADDRESSES
albuterol (PROAIR HFA/PROVENTIL HFA/VENTOLIN HFA) 108 (90 Base) MCG/ACT inhaler       Last Written Prescription Date:  12/14/2022  Last Fill Quantity: 8.5 g,   # refills: 0  Last Office Visit: 9/30/2022  Future Office visit:            [email or web address]
